# Patient Record
Sex: MALE | Race: ASIAN | NOT HISPANIC OR LATINO | URBAN - METROPOLITAN AREA
[De-identification: names, ages, dates, MRNs, and addresses within clinical notes are randomized per-mention and may not be internally consistent; named-entity substitution may affect disease eponyms.]

---

## 2024-03-23 ENCOUNTER — EMERGENCY (EMERGENCY)
Facility: HOSPITAL | Age: 44
LOS: 1 days | Discharge: HOME | End: 2024-03-23
Attending: EMERGENCY MEDICINE
Payer: COMMERCIAL

## 2024-03-23 VITALS
SYSTOLIC BLOOD PRESSURE: 129 MMHG | OXYGEN SATURATION: 98 % | TEMPERATURE: 98 F | WEIGHT: 190.04 LBS | DIASTOLIC BLOOD PRESSURE: 85 MMHG | HEART RATE: 66 BPM | HEIGHT: 69 IN | RESPIRATION RATE: 17 BRPM

## 2024-03-23 VITALS
HEART RATE: 74 BPM | TEMPERATURE: 98 F | DIASTOLIC BLOOD PRESSURE: 80 MMHG | RESPIRATION RATE: 18 BRPM | SYSTOLIC BLOOD PRESSURE: 122 MMHG | OXYGEN SATURATION: 98 %

## 2024-03-23 PROCEDURE — 29515 APPLICATION SHORT LEG SPLINT: CPT | Mod: RT

## 2024-03-23 PROCEDURE — 73590 X-RAY EXAM OF LOWER LEG: CPT

## 2024-03-23 PROCEDURE — 73610 X-RAY EXAM OF ANKLE: CPT

## 2024-03-23 PROCEDURE — 73610 X-RAY EXAM OF ANKLE: CPT | Mod: 26,RT

## 2024-03-23 PROCEDURE — 99284 EMERGENCY DEPT VISIT MOD MDM: CPT | Mod: 25

## 2024-03-23 PROCEDURE — 73590 X-RAY EXAM OF LOWER LEG: CPT | Mod: 26,RT

## 2024-03-23 RX ORDER — IBUPROFEN 200 MG
600 TABLET ORAL ONCE
Refills: 0 | Status: COMPLETED | OUTPATIENT
Start: 2024-03-23 | End: 2024-03-23

## 2024-03-23 RX ORDER — ACETAMINOPHEN 500 MG
975 TABLET ORAL ONCE
Refills: 0 | Status: DISCONTINUED | OUTPATIENT
Start: 2024-03-23 | End: 2024-03-23

## 2024-03-23 RX ADMIN — Medication 600 MILLIGRAM(S): at 12:58

## 2024-03-23 NOTE — ED PROVIDER NOTE - OBJECTIVE STATEMENT
44yo male pt, no PMHx presents to ED with right calf and achilles pain while playing squash today. Reports he felt a sharp pain with pop to his achilles area while playing squash. Denies other injuries. Denies sensory changes or weakness to extremities. Denies headache, neck or back pain.

## 2024-03-23 NOTE — ED PROVIDER NOTE - NSFOLLOWUPINSTRUCTIONS_ED_ALL_ED_FT
Please see the information of Achilles tendon rupture, muscle strain, and crutches instruction.    Elevate the affected leg.    Ice to pain area; every 2hours for 20minutes.    Keep the splint clean, dry, and intact.    Use crutches as instructed.     Take Ibuprofen (600mg every 8hours with food) or Tylenol (2 tablets of 500mg every 8hours) as needed for pain.    Follow up with your orthopedist or Dr. Blanco for reevaluation in one week, call Monday for appointment.  Address: [address] [place] [address] [address], [place] [address]  Phone: [phone]    Return for any concerns, fever, chills, numbness, weakness, or worsening pain.

## 2024-03-23 NOTE — ED ADULT NURSE NOTE - NSFALLRISKINTERV_ED_ALL_ED

## 2024-03-23 NOTE — ED PROVIDER NOTE - CARE PLAN
1 Principal Discharge DX:	Achilles rupture, right  Secondary Diagnosis:	Strain of right gastrocnemius muscle

## 2024-03-23 NOTE — ED PROVIDER NOTE - PATIENT PORTAL LINK FT
You can access the FollowMyHealth Patient Portal offered by [facility] by registering at the following website: [email or web address] By joining [facility]’s FollowMyHealth portal, you will also be able to view your health information using other applications (apps) compatible with our system.

## 2024-03-23 NOTE — ED PROVIDER NOTE - PHYSICAL EXAMINATION
NAD. VSS. Afebrile. Neck supple. Lungs clear. ABD soft, non tender. No spinal tender. No cva tender. No hip tender. Right calf; distal gastro and achilles tender, no obvious swelling or lesion. N/V- intact. No knee tender. No focal neuro deficit. NAD. VSS. Afebrile. Neck supple. Lungs clear. ABD soft, non tender. No spinal tender. No cva tender. No hip tender. Right calf; distal gastro and achilles tender, no obvious swelling or lesion, and +Salcedo test. N/V- intact. No knee tender. No focal neuro deficit.

## 2024-03-23 NOTE — ED ADULT NURSE NOTE - OBJECTIVE STATEMENT
Pt is a 44 y/o M with no significant PMH presenting with R LL pain. Pt endorses playing Squash one hour ago hearing "a pop in my achilles while running". Endorses sharp R posterior lower leg pain. Upon assessment pt is a/o x 3 speaking coherently in full sentences. Gross motor strength and sensation intact across 4 extremities. No ROM deficits noted on R lower extremity. Pt is breathing unlabored and equal BL in no apparent distress, abdomen is soft, nontender, and nondistended, skin is warm and dry. Pt denies fevers/chills, headaches/vision changes, chest pain/SOB, n/v/d, or changes in urinary/defecation habits.  Pt is In stretcher in lowest position with side rails up. Pt is a 42 y/o M with no significant PMH presenting with R LL pain. Pt endorses playing Squash one hour ago hearing "a pop in my achilles while running". Endorses sharp R posterior lower leg pain. Endorses taking 1300mg Tylenol with minimal relief. Upon assessment pt is a/o x 3 speaking coherently in full sentences. Gross motor strength and sensation intact across 4 extremities. No ROM deficits noted on R lower extremity. Pt is breathing unlabored and equal BL in no apparent distress, abdomen is soft, nontender, and nondistended, skin is warm and dry. Pt denies fevers/chills, headaches/vision changes, chest pain/SOB, n/v/d, or changes in urinary/defecation habits.  Pt is In stretcher in lowest position with side rails up.

## 2024-03-23 NOTE — ED PROVIDER NOTE - ATTENDING APP SHARED VISIT CONTRIBUTION OF CARE
Patient is a 43-year-old male complaining of calf Achilles pain after playing squash felt a pop had immediate pain difficulty bearing weight when he squeezes his calf he states a lot of pain no pain over the Achilles region although positive Salcedo sign versus increased pain x-rays ordered likely posterior splint and outpatient Ortho follow-up analgesia.

## 2024-10-15 NOTE — ED ADULT NURSE NOTE - CCCP TRG CHIEF CMPLNT
Patient canceled PET scan due to cough and not being able to stay still for 45 minutes. DA 10/15/24    pain, lower leg